# Patient Record
Sex: MALE | NOT HISPANIC OR LATINO | URBAN - NONMETROPOLITAN AREA
[De-identification: names, ages, dates, MRNs, and addresses within clinical notes are randomized per-mention and may not be internally consistent; named-entity substitution may affect disease eponyms.]

---

## 2018-12-04 ENCOUNTER — OFFICE VISIT CONVERTED (OUTPATIENT)
Dept: FAMILY MEDICINE CLINIC | Age: 63
End: 2018-12-04
Attending: NURSE PRACTITIONER

## 2020-01-06 ENCOUNTER — OFFICE VISIT CONVERTED (OUTPATIENT)
Dept: FAMILY MEDICINE CLINIC | Age: 65
End: 2020-01-06
Attending: NURSE PRACTITIONER

## 2021-05-18 NOTE — PROGRESS NOTES
Jaden Francisco  1955     Office/Outpatient Visit    Visit Date: Mon, Jan 6, 2020 12:21 pm    Provider: Singh Christian N.P. (Assistant: Lola Yuan MA)    Location: [facility]        Electronically signed by Singh Christian N.P. on  01/12/2020 01:52:46 PM                             Subjective:        CC: Mr. Francisco is a 64 year old White male.  Patient presents today with complaints of cough and congestion X 1 week         HPI:           PHQ-9 Depression Screening: Completed form scanned and in chart; Total Score 0           Additionally, he presents with history of acute upper respiratory infection, unspecified.  these have been present for the past one week.  The symptoms include chest congestion, productive cough, headache, nasal congestion and sputum production.  He denies fever or wheezing.  He denies exposure to ill contacts.  He has already tried to relieve the symptoms with cough medication and decongestants.  Pertinent medical history is unremarkable.      ROS:     CONSTITUTIONAL:  Negative for chills, fatigue and fever.      CARDIOVASCULAR:  Negative for chest pain, orthopnea, paroxysmal nocturnal dyspnea and pedal edema.      RESPIRATORY:  Positive for recent cough ( with scant amounts of purulent sputum ).   Negative for dyspnea.      GASTROINTESTINAL:  Negative for abdominal pain, heartburn, constipation, diarrhea, and stool changes.      PSYCHIATRIC:  Negative for anxiety and depression.          Past Medical History / Family History / Social History:         Last Reviewed on 1/06/2020 12:48 PM by Singh Christian    Past Medical History:             PAST MEDICAL HISTORY         left knee arthritis         PREVENTIVE HEALTH MAINTENANCE             COLORECTAL CANCER SCREENING: had at age 52  [facility]     DENTAL CLEANING: was last done 2018     EYE EXAM: was last done 2018 - [facility]         PAST MEDICAL HISTORY         Positive for    right  scapula injury;         Surgical History:         Appendectomy         Family History:         Mother: Dementia     Brother(s): MELANOMA - TREATED AND CURED         Social History:     Occupation: remodeling, 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          Alcohol:  Does not drink alcohol and never has.      Caffeine:  He admits to consuming caffeine via coffee.          Current Problems:     Last Reviewed on 1/06/2020 12:48 PM by Singh Christian    Mixed hyperlipidemia    Encounter for screening for depression        Immunizations:     Td adult 1/30/2009    Hep A, adult dose 12/4/2018    Fluzone Quadrivalent (3+ years) 12/4/2018        Allergies:     Last Reviewed on 1/06/2020 12:48 PM by Singh Christian    No Known Allergies.        Current Medications:     Last Reviewed on 1/06/2020 12:48 PM by Singh Christian    No Known Medications.        Objective:        Vitals:         Current: 1/6/2020 12:25:03 PM    Ht:  5 ft, 8 in;  Wt: 191 lbs;  BMI: 29.0T: 98.5 F (oral);  BP: 127/82 mm Hg (left arm, sitting);  P: 80 bpm (left arm (BP Cuff), sitting);  sCr: 1.06 mg/dL;  GFR: 70.39        Exams:     PHYSICAL EXAM:     GENERAL: Vitals recorded well developed, well nourished;  well groomed;  no apparent distress;     RESPIRATORY: normal respiratory rate and pattern with no distress; decreased breath sounds throughout; coarse breath sounds throughout;     CARDIOVASCULAR: normal rate; rhythm is regular;  normal S1; normal S2; no systolic murmur; no cyanosis; no edema;     GASTROINTESTINAL: nontender, nondistended; no hepatosplenomegaly or masses; no bruits;     NEUROLOGIC: GROSSLY INTACT     PSYCHIATRIC:  appropriate affect and demeanor; normal speech pattern; grossly normal memory;         Assessment:         Z13.31   Encounter for screening for depression        J20.9   Acute bronchitis, unspecified           ORDERS:         Meds Prescribed:       [New Rx] azithromycin 250 mg oral tablet [take 2 tablets today and 1 tablet daily x 4 days ], #6 (six) tablets, Refills: 0 (zero)         Other Orders:         Depression screen negative  (In-House)                      Plan:         Encounter for screening for depression    MIPS PHQ-9 Depression Screening: Completed form scanned and in chart; Total Score 0; Negative Depression Screen           Orders:         Depression screen negative  (In-House)              Acute bronchitis, unspecified        FOLLOW-UP: Advised to call if there is no improvement Follow-up by phone if no improvement in 1 week..  :Schedule follow-up appointments on a p.r.n. basis.:.            Prescriptions:       [New Rx] azithromycin 250 mg oral tablet [take 2 tablets today and 1 tablet daily x 4 days ], #6 (six) tablets, Refills: 0 (zero)             Patient Recommendations:        For  Acute bronchitis, unspecified:    Follow-up by phone if no improvement in 1 week. Schedule follow-up appointments as needed.                APPOINTMENT INFORMATION:        Monday Tuesday Wednesday Thursday Friday Saturday Sunday            Time:___________________AM  PM   Date:_____________________             Charge Capture:         Primary Diagnosis:     Z13.31  Encounter for screening for depression           Orders:      70664  Office/outpatient visit; established patient, level 3  (In-House)              Depression screen negative  (In-House)              J20.9  Acute bronchitis, unspecified

## 2021-05-19 NOTE — PROGRESS NOTES
Jaden Francisco 1955     Preventive Medicine Services    Visit Date: Tue, Dec 4, 2018 02:49 pm    Provider: Virginia Adam N.P. (Assistant: Azalea Corbin MA)    Location: [facility]        Electronically signed by Virginia Adam N.P. on  12/05/2018 03:08:16 PM                             SUBJECTIVE:        CC:     Mr. Francisco is a 63 year old White male.  This is his first visit to the clinic.  establish care (PT WANTS FLU VACCINE TODAY)         HPI:         Mr. Francisco is here for routine periodic health screening and examination.  His last physical exam was been some time\.      Physical Activity: ** Never exercises; He is current with his influenza immunization.      ROS:     CONSTITUTIONAL:  Negative for chills, fatigue and fever.      EYES:  Positive for use of glasses.   Negative for eye drainage.      E/N/T:  Negative for ear pain, nasal congestion and sore throat.      CARDIOVASCULAR:  Negative for chest pain and pedal edema.      RESPIRATORY:  Negative for recent cough, dyspnea and frequent wheezing.      GASTROINTESTINAL:  Positive for acid reflux symptoms ( one time this week but not normally ).   Negative for abdominal pain, constipation, diarrhea, nausea or vomiting.      GENITOURINARY:  Negative for dysuria and change in urine stream.      MUSCULOSKELETAL:  Positive for limb pain ( left  knee - chronic osteoarthritis ).   Negative for arthralgias or myalgias.      INTEGUMENTARY:  Negative for atypical mole(s) and rash.      NEUROLOGICAL:  Negative for dizziness and headaches.      HEMATOLOGIC/LYMPHATIC:  Negative for easy bruising and history of blood transfusion.      ENDOCRINE:  Negative for hair loss, polydipsia and polyphagia.      ALLERGIC/IMMUNOLOGIC:  Negative for seasonal allergies.      PSYCHIATRIC:  Negative for anxiety, crying spells, depression, feelings of stress, anhedonia, sadness, sleep disturbance and suicidal thoughts.          PMH/FMH/SH:     Last Reviewed  on 12/04/2018 03:10 PM by Virginia Adam    Past Medical History:             PAST MEDICAL HISTORY         left knee arthritis         PREVENTIVE HEALTH MAINTENANCE             COLORECTAL CANCER SCREENING: had at age 52  [facility]     DENTAL CLEANING: was last done 2018     EYE EXAM: was last done 2018 - [facility]         PAST MEDICAL HISTORY         Positive for    right scapula injury;         Surgical History:         Appendectomy         Family History:         Mother: Dementia     Brother(s): MELANOMA - TREATED AND CURED         Social History:     Occupation: remodeling;     Marital Status:      Children: 2 children         Tobacco/Alcohol/Supplements:     Last Reviewed on 12/04/2018 03:10 PM by Virginia Adam    Tobacco: He has never smoked.          Alcohol:  Does not drink alcohol and never has.      Caffeine:  He admits to consuming caffeine via coffee.          Substance Abuse History:     Last Reviewed on 12/04/2018 03:10 PM by Virginia Adam    None         Mental Health History:     Last Reviewed on 12/04/2018 03:10 PM by Virginia Adam    NEGATIVE         Communicable Diseases (eg STDs):     Last Reviewed on 12/04/2018 03:10 PM by Virginia Adam    Reportable health conditions; NEGATIVE             Current Problems:     Last Reviewed on 12/04/2018 03:10 PM by Virginia Adam    Hyperlipidemia     Screening for cardiovascular conditions         Immunizations:     Td adult 1/30/2009     Hep A, adult dose 12/4/2018     Fluzone Quadrivalent (3+ years) 12/4/2018         Allergies:     Last Reviewed on 12/04/2018 03:10 PM by Virginia Adam      No Known Drug Allergies.         Current Medications:     Last Reviewed on 12/04/2018 03:10 PM by Virginia Adam      No Known Medications.         OBJECTIVE:        Vitals:         Current: 12/4/2018 2:55:17 PM    Ht:  5 ft, 8 in;  Wt: 185.2 lbs;  BMI: 28.2    T: 98.2 F (oral);  BP: 122/70 mm Hg (left arm,  sitting);  P: 71 bpm (left arm (BP Cuff), sitting);  sCr: 1.06 mg/dL;  GFR: 70.33        Exams:     PHYSICAL EXAM:     GENERAL: Vitals recorded well developed, well nourished;  no apparent distress;     EYES: PERRL, EOMI     E/N/T: EARS: external auditory canal normal bilaterally;  bilateral TMs are normal;  NOSE:  normal nasal mucosa, septum, turbinates, and sinuses; OROPHARYNX:  normal mucosa, dentition, gingiva, and posterior pharynx;     NECK: range of motion is normal;     RESPIRATORY: normal appearance and symmetric expansion of chest wall; normal respiratory rate and pattern with no distress; normal breath sounds with no rales, rhonchi, wheezes or rubs;     CARDIOVASCULAR: normal rate; rhythm is regular;  no edema;     GASTROINTESTINAL: nontender; normal bowel sounds; no organomegaly;     LYMPHATIC: no enlargement of cervical or facial nodes; no supraclavicular nodes;     MUSCULOSKELETAL: normal gait; normal range of motion of all major muscle groups; no limb or joint pain with range of motion;     NEUROLOGIC: mental status: alert and oriented x 3;     PSYCHIATRIC: appropriate affect and demeanor; normal speech pattern; normal thought and perception;         Lab/Test Results:             Glucose, Serum:  101 (mg/dL) (11/30/2018),     Total Cholesterol:  212 (mg/dL) (11/30/2018),     HDL:  52 (mg/dL) (11/30/2018),     Triglycerides:  102 (mg/dL) (11/30/2018),     LDL:  140 (mg/dL) (11/30/2018),             Procedures:     Vaccination against other viral diseases, Influenza     1. Hepatitis A (adult): 1.0 ml Influenza, seasonal PF (children 3 years to adult): 0.5 ml unit dose given IM in the left upper arm; administered by [facility];  lot number [id number]; expires 6-30-19         Vaccination against hepatitis A     1. Hepatitis A (adult): 1.0 ml given IM in the right upper arm; administered by [facility];  lot number [id number]; expires 8-9-19             ASSESSMENT           V04.81   Z23  Vaccination against other viral  diseases, Influenza              DDx:     V70.0   Z00.00  Health checkup              DDx:     V05.3   Z23  Vaccination against hepatitis A              DDx:     272.4   E78.2  Hyperlipidemia              DDx:         ORDERS:         Procedures Ordered:       17172  HepA vaccine adult dose for intramuscular use  (In-House)         14067  Immunization administration; one vaccine  (In-House)         47234  Immunization administration; each additional vaccine/toxoid  (In-House)           Other Orders:       01169  Influenza virus vaccine, quadrivalent, split virus, preservative free 3 years of age and older  (In-House)           Depression screen negative  (In-House)         1101F  Pt screen for fall risk; document no falls in past year or only 1 fall w/o injury in past year (SYLVIA)  (In-House)           Negative EtOH screen  (In-House)           Calculated BMI above the upper parameter and a follow-up plan was documented in the medical record  (In-House)                   PLAN:          Vaccination against other viral diseases, Influenza         IMMUNIZATIONS given today: Influenza Quadrivalent psv free shot 3 and up.            Orders:       46301  Influenza virus vaccine, quadrivalent, split virus, preservative free 3 years of age and older  (In-House)         36829  Immunization administration; one vaccine  (In-House)            Health checkup     MIPS PHQ-9 Depression Screening Completed form scanned and in chart; Total Score 2 Negative alcohol screen     BMI Elevated - Follow-Up Plan: He was provided education on weight loss strategies           Orders:         Depression screen negative  (In-House)         1101F  Pt screen for fall risk; document no falls in past year or only 1 fall w/o injury in past year (SYLVIA)  (In-House)           Negative EtOH screen  (In-House)           Calculated BMI above the upper parameter and a follow-up plan was documented in the medical record  (In-House)             Vaccination against hepatitis A         IMMUNIZATIONS given today: Hepatitis A.            Orders:       46455  HepA vaccine adult dose for intramuscular use  (In-House)         30795  Immunization administration; each additional vaccine/toxoid  (In-House)            Hyperlipidemia discussed labs today and recommended daily ASA, dietary changes for improvement on lipid, weight loss             CHARGE CAPTURE           **Please note: ICD descriptions below are intended for billing purposes only and may not represent clinical diagnoses**        Primary Diagnosis:         V04.81 Vaccination against other viral diseases, Influenza            Z23    Encounter for immunization              Orders:          03825   Preventive medicine, established patient, age 40-64 years  (In-House)             93020   Influenza virus vaccine, quadrivalent, split virus, preservative free 3 years of age and older  (In-House)             88750   Immunization administration; one vaccine  (In-House)           V70.0 Health checkup            Z00.00    Encntr for general adult medical exam w/o abnormal findings              Orders:             Depression screen negative  (In-House)             1101F   Pt screen for fall risk; document no falls in past year or only 1 fall w/o injury in past year (SYLVIA)  (In-House)                Negative EtOH screen  (In-House)                Calculated BMI above the upper parameter and a follow-up plan was documented in the medical record  (In-House)           V05.3 Vaccination against hepatitis A            Z23    Encounter for immunization              Orders:          46458   HepA vaccine adult dose for intramuscular use  (In-House)             26471   Immunization administration; each additional vaccine/toxoid  (In-House)           272.4 Hyperlipidemia            E78.2    Mixed hyperlipidemia

## 2021-07-01 VITALS
WEIGHT: 185.2 LBS | HEIGHT: 68 IN | TEMPERATURE: 98.2 F | SYSTOLIC BLOOD PRESSURE: 122 MMHG | DIASTOLIC BLOOD PRESSURE: 70 MMHG | HEART RATE: 71 BPM | BODY MASS INDEX: 28.07 KG/M2

## 2021-07-02 VITALS
HEIGHT: 68 IN | TEMPERATURE: 98.5 F | HEART RATE: 80 BPM | BODY MASS INDEX: 28.95 KG/M2 | WEIGHT: 191 LBS | SYSTOLIC BLOOD PRESSURE: 127 MMHG | DIASTOLIC BLOOD PRESSURE: 82 MMHG

## 2024-01-30 ENCOUNTER — LAB (OUTPATIENT)
Dept: LAB | Facility: HOSPITAL | Age: 69
End: 2024-01-30
Payer: MEDICARE

## 2024-01-30 ENCOUNTER — OFFICE VISIT (OUTPATIENT)
Dept: FAMILY MEDICINE CLINIC | Age: 69
End: 2024-01-30
Payer: MEDICARE

## 2024-01-30 VITALS
SYSTOLIC BLOOD PRESSURE: 138 MMHG | HEIGHT: 68 IN | DIASTOLIC BLOOD PRESSURE: 90 MMHG | TEMPERATURE: 97.7 F | WEIGHT: 185 LBS | BODY MASS INDEX: 28.04 KG/M2 | OXYGEN SATURATION: 94 % | HEART RATE: 65 BPM

## 2024-01-30 DIAGNOSIS — Z00.00 MEDICARE ANNUAL WELLNESS VISIT, SUBSEQUENT: Primary | ICD-10-CM

## 2024-01-30 DIAGNOSIS — Z12.5 SCREENING FOR MALIGNANT NEOPLASM OF PROSTATE: ICD-10-CM

## 2024-01-30 DIAGNOSIS — Z13.6 SCREENING FOR CARDIOVASCULAR CONDITION: ICD-10-CM

## 2024-01-30 DIAGNOSIS — Z12.11 SCREENING FOR COLON CANCER: ICD-10-CM

## 2024-01-30 DIAGNOSIS — Z11.59 SCREENING FOR VIRAL DISEASE: ICD-10-CM

## 2024-01-30 PROBLEM — C44.320 SQUAMOUS CELL CARCINOMA, FACE: Status: ACTIVE | Noted: 2024-01-30

## 2024-01-30 PROBLEM — M25.561 CHRONIC PAIN OF BOTH KNEES: Status: ACTIVE | Noted: 2024-01-30

## 2024-01-30 PROBLEM — G89.29 CHRONIC PAIN OF BOTH KNEES: Status: ACTIVE | Noted: 2024-01-30

## 2024-01-30 PROBLEM — M25.562 CHRONIC PAIN OF BOTH KNEES: Status: ACTIVE | Noted: 2024-01-30

## 2024-01-30 LAB
ALBUMIN SERPL-MCNC: 4.3 G/DL (ref 3.5–5.2)
ALBUMIN/GLOB SERPL: 1.9 G/DL
ALP SERPL-CCNC: 62 U/L (ref 39–117)
ALT SERPL W P-5'-P-CCNC: 14 U/L (ref 1–41)
ANION GAP SERPL CALCULATED.3IONS-SCNC: 11 MMOL/L (ref 5–15)
AST SERPL-CCNC: 21 U/L (ref 1–40)
BILIRUB SERPL-MCNC: 0.5 MG/DL (ref 0–1.2)
BUN SERPL-MCNC: 12 MG/DL (ref 8–23)
BUN/CREAT SERPL: 10.9 (ref 7–25)
CALCIUM SPEC-SCNC: 9.5 MG/DL (ref 8.6–10.5)
CHLORIDE SERPL-SCNC: 103 MMOL/L (ref 98–107)
CHOLEST SERPL-MCNC: 222 MG/DL (ref 0–200)
CO2 SERPL-SCNC: 26 MMOL/L (ref 22–29)
CREAT SERPL-MCNC: 1.1 MG/DL (ref 0.76–1.27)
EGFRCR SERPLBLD CKD-EPI 2021: 73.6 ML/MIN/1.73
GLOBULIN UR ELPH-MCNC: 2.3 GM/DL
GLUCOSE SERPL-MCNC: 92 MG/DL (ref 65–99)
HCV AB SER DONR QL: NORMAL
HDLC SERPL-MCNC: 50 MG/DL (ref 40–60)
LDLC SERPL CALC-MCNC: 152 MG/DL (ref 0–100)
LDLC/HDLC SERPL: 2.99 {RATIO}
POTASSIUM SERPL-SCNC: 4.4 MMOL/L (ref 3.5–5.2)
PROT SERPL-MCNC: 6.6 G/DL (ref 6–8.5)
PSA SERPL-MCNC: 0.52 NG/ML (ref 0–4)
SODIUM SERPL-SCNC: 140 MMOL/L (ref 136–145)
TRIGL SERPL-MCNC: 112 MG/DL (ref 0–150)
VLDLC SERPL-MCNC: 20 MG/DL (ref 5–40)

## 2024-01-30 PROCEDURE — 36415 COLL VENOUS BLD VENIPUNCTURE: CPT

## 2024-01-30 PROCEDURE — 80061 LIPID PANEL: CPT

## 2024-01-30 PROCEDURE — G0103 PSA SCREENING: HCPCS

## 2024-01-30 PROCEDURE — 86803 HEPATITIS C AB TEST: CPT

## 2024-01-30 PROCEDURE — 80053 COMPREHEN METABOLIC PANEL: CPT

## 2024-01-30 NOTE — PROGRESS NOTES
The ABCs of the Annual Wellness Visit  Subsequent Medicare Wellness Visit    Subjective      Jaden Francisco is a 69 y.o. male who presents for a Subsequent Medicare Wellness Visit.    The following portions of the patient's history were reviewed and   updated as appropriate: allergies, current medications, past family history, past medical history, past social history, past surgical history, and problem list.    Compared to one year ago, the patient feels his physical   health is the same.    Compared to one year ago, the patient feels his mental   health is the same.    Recent Hospitalizations:  He was not admitted to the hospital during the last year.       Current Medical Providers:  Patient Care Team:  Virginia Adam APRN as PCP - General (Nurse Practitioner)  Renita Harley MD (Dermatology)  Cabrera Mon MD as Consulting Physician (Orthopedic Surgery)    No outpatient medications prior to visit.     No facility-administered medications prior to visit.       No opioid medication identified on active medication list. I have reviewed chart for other potential  high risk medication/s and harmful drug interactions in the elderly.        Aspirin is not on active medication list.  Aspirin use is not indicated based on review of current medical condition/s. Risk of harm outweighs potential benefits.  .    Patient Active Problem List   Diagnosis    Chronic pain of both knees    Squamous cell carcinoma, face     Advance Care Planning   Advance Care Planning     Advance Directive is not on file.  ACP discussion was held with the patient during this visit. Patient does not have an advance directive, declines further assistance.     Objective    Vitals:    01/30/24 0947 01/30/24 0951   BP: 136/98 138/90   BP Location: Left arm Right arm   Patient Position: Sitting Sitting   Cuff Size: Infant Adult   Pulse: 68 65   Temp: 97.7 °F (36.5 °C)    TempSrc: Oral    SpO2: 94%    Weight: 83.9 kg (185 lb)    Height: 172.7 cm  "(67.99\")      Estimated body mass index is 28.14 kg/m² as calculated from the following:    Height as of this encounter: 172.7 cm (67.99\").    Weight as of this encounter: 83.9 kg (185 lb).    BMI is >= 25 and <30. (Overweight) The following options were offered after discussion;: exercise counseling/recommendations and nutrition counseling/recommendations      Does the patient have evidence of cognitive impairment?   No    He does not wish to get any vaccines including pneumonia, shingrix, RSV, covid, influenza, Tdap.  He is not up to date on his colonoscopy and is willing to schedule.            HEALTH RISK ASSESSMENT    Smoking Status:  Social History     Tobacco Use   Smoking Status Never   Smokeless Tobacco Never     Alcohol Consumption:  Social History     Substance and Sexual Activity   Alcohol Use Never     Fall Risk Screen:    GILLIAN Fall Risk Assessment was completed, and patient is at LOW risk for falls.Assessment completed on:2024    Depression Screenin/30/2024     9:40 AM   PHQ-2/PHQ-9 Depression Screening   Little Interest or Pleasure in Doing Things 0-->not at all   Feeling Down, Depressed or Hopeless 0-->not at all   PHQ-9: Brief Depression Severity Measure Score 0       Health Habits and Functional and Cognitive Screenin/30/2024    10:38 AM   Functional & Cognitive Status   Do you have difficulty preparing food and eating? No   Do you have difficulty bathing yourself, getting dressed or grooming yourself? No   Do you have difficulty using the toilet? No   Do you have difficulty moving around from place to place? No   Do you have trouble with steps or getting out of a bed or a chair? No   Current Diet Well Balanced Diet   Dental Exam Up to date   Eye Exam Up to date   Exercise (times per week) 7 times per week   Current Exercises Include Yard Work   Do you need help using the phone?  No   Are you deaf or do you have serious difficulty hearing?  No   Do you need help to go to " places out of walking distance? No   Do you need help shopping? No   Do you need help preparing meals?  No   Do you need help with housework?  No   Do you need help with laundry? No   Do you need help taking your medications? No   Do you need help managing money? No   Do you ever drive or ride in a car without wearing a seat belt? No   Have you felt unusual stress, anger or loneliness in the last month? No   Who do you live with? Spouse   If you need help, do you have trouble finding someone available to you? No   Have you been bothered in the last four weeks by sexual problems? No   Do you have difficulty concentrating, remembering or making decisions? No       Age-appropriate Screening Schedule:  Refer to the list below for future screening recommendations based on patient's age, sex and/or medical conditions. Orders for these recommended tests are listed in the plan section. The patient has been provided with a written plan.    Health Maintenance   Topic Date Due    COLORECTAL CANCER SCREENING  Never done    HEPATITIS C SCREENING  Never done    COVID-19 Vaccine (3 - 2023-24 season) 02/01/2024 (Originally 9/1/2023)    INFLUENZA VACCINE  03/31/2024 (Originally 8/1/2023)    Pneumococcal Vaccine 65+ (1 of 1 - PCV) 01/30/2025 (Originally 1/24/2022)    TDAP/TD VACCINES (2 - Tdap) 01/30/2025 (Originally 3/8/2007)    ZOSTER VACCINE (1 of 2) 01/30/2025 (Originally 1/24/2007)    ANNUAL WELLNESS VISIT  01/30/2025    BMI FOLLOWUP  01/30/2025                  CMS Preventative Services Quick Reference  Risk Factors Identified During Encounter:    Immunizations Discussed/Encouraged: Tdap, Influenza, Prevnar 20 (Pneumococcal 20-valent conjugate), Shingrix, COVID19, and RSV (Respiratory Syncytial Virus)    The above risks/problems have been discussed with the patient.  Pertinent information has been shared with the patient in the After Visit Summary.    Diagnoses and all orders for this visit:    1. Medicare annual wellness  visit, subsequent (Primary)    2. Screening for cardiovascular condition  -     Comprehensive metabolic panel; Future  -     Lipid panel; Future    3. Screening for colon cancer  -     Ambulatory Referral For Screening Colonoscopy    4. Screening for viral disease  -     Hepatitis C antibody; Future    5. Screening for malignant neoplasm of prostate  -     PSA SCREENING; Future        Follow Up:   Next Medicare Wellness visit to be scheduled in 1 year.      An After Visit Summary and PPPS were made available to the patient.

## 2024-02-23 PROBLEM — K57.90 DIVERTICULOSIS: Status: ACTIVE | Noted: 2024-02-23

## 2025-02-03 ENCOUNTER — OFFICE VISIT (OUTPATIENT)
Dept: FAMILY MEDICINE CLINIC | Age: 70
End: 2025-02-03
Payer: MEDICARE

## 2025-02-03 VITALS
TEMPERATURE: 98.1 F | OXYGEN SATURATION: 97 % | SYSTOLIC BLOOD PRESSURE: 138 MMHG | WEIGHT: 189 LBS | HEIGHT: 68 IN | HEART RATE: 63 BPM | BODY MASS INDEX: 28.64 KG/M2 | DIASTOLIC BLOOD PRESSURE: 89 MMHG

## 2025-02-03 DIAGNOSIS — M17.12 OSTEOARTHRITIS OF LEFT KNEE, UNSPECIFIED OSTEOARTHRITIS TYPE: ICD-10-CM

## 2025-02-03 DIAGNOSIS — Z00.00 MEDICARE ANNUAL WELLNESS VISIT, SUBSEQUENT: Primary | ICD-10-CM

## 2025-02-03 PROCEDURE — 1170F FXNL STATUS ASSESSED: CPT | Performed by: NURSE PRACTITIONER

## 2025-02-03 PROCEDURE — 1125F AMNT PAIN NOTED PAIN PRSNT: CPT | Performed by: NURSE PRACTITIONER

## 2025-02-03 PROCEDURE — 1160F RVW MEDS BY RX/DR IN RCRD: CPT | Performed by: NURSE PRACTITIONER

## 2025-02-03 PROCEDURE — G0439 PPPS, SUBSEQ VISIT: HCPCS | Performed by: NURSE PRACTITIONER

## 2025-02-03 PROCEDURE — 1159F MED LIST DOCD IN RCRD: CPT | Performed by: NURSE PRACTITIONER

## 2025-02-03 PROCEDURE — 99213 OFFICE O/P EST LOW 20 MIN: CPT | Performed by: NURSE PRACTITIONER

## 2025-02-03 NOTE — PROGRESS NOTES
"The ABCs of the Annual Wellness Visit  Subsequent Medicare Wellness Visit    Subjective    Jaden Francisco is a 70 y.o. patient who presents for a Subsequent Medicare Wellness Visit.    The following portions of the patient's history were reviewed and updated as appropriate: allergies, current medications, past family history, past medical history, past social history, past surgical history, and problem list.    Compared to one year ago, the patient feels his physical health is worse. Just over his knees    Compared to one year ago, the patient feels his mental health is the same.    Recent Hospitalizations:  He was not admitted to the hospital during the last year.     Current Medical Providers:  Patient Care Team:  Virginia Adam APRN as PCP - General (Nurse Practitioner)  Renita Harley MD (Dermatology)  Cabrera Mon MD as Consulting Physician (Orthopedic Surgery)    No opioid medication identified on active medication list. I have reviewed chart for other potential  high risk medication/s and harmful drug interactions in the elderly.        Aspirin is not on active medication list.  Aspirin use is not indicated based on review of current medical condition/s. Risk of harm outweighs potential benefits.  .      Patient Active Problem List   Diagnosis    Chronic pain of both knees    Squamous cell carcinoma, face    Diverticulosis       Advance Care Planning  Advance Directive is not on file.  ACP discussion was held with the patient during this visit. Patient does not have an advance directive, declines further assistance.    Objective    Vitals:    02/03/25 0908   BP: 138/89   BP Location: Right arm   Patient Position: Sitting   Cuff Size: Adult   Pulse: 63   Temp: 98.1 °F (36.7 °C)   TempSrc: Oral   SpO2: 97%   Weight: 85.7 kg (189 lb)   Height: 172.7 cm (67.99\")   PainSc:   2     Estimated body mass index is 28.75 kg/m² as calculated from the following:    Height as of this encounter: 172.7 cm (67.99\").   "  Weight as of this encounter: 85.7 kg (189 lb).    BMI is >= 25 and <30. (Overweight) The following options were offered after discussion;: weight loss educational material (shared in after visit summary)      Does the patient have evidence of cognitive impairment? No          HEALTH RISK ASSESSMENT    Smoking Status:  Social History     Tobacco Use   Smoking Status Never   Smokeless Tobacco Never     Alcohol Consumption:  Social History     Substance and Sexual Activity   Alcohol Use Never     Fall Risk Screen:    GILLIAN Fall Risk Assessment was completed, and patient is at LOW risk for falls.Assessment completed on:2/3/2025    Depression Screenin/3/2025     9:13 AM   PHQ-2/PHQ-9 Depression Screening   Little interest or pleasure in doing things Not at all   Feeling down, depressed, or hopeless Not at all       Health Habits and Functional and Cognitive Screenin/3/2025     9:00 AM   Functional & Cognitive Status   Do you have difficulty preparing food and eating? No   Do you have difficulty bathing yourself, getting dressed or grooming yourself? No   Do you have difficulty using the toilet? No   Do you have difficulty moving around from place to place? No   Do you have trouble with steps or getting out of a bed or a chair? No   Current Diet Well Balanced Diet   Dental Exam Up to date   Eye Exam Up to date   Exercise (times per week) 7 times per week   Current Exercises Include Yard Work   Do you need help using the phone?  No   Are you deaf or do you have serious difficulty hearing?  No   Do you need help to go to places out of walking distance? No   Do you need help shopping? No   Do you need help preparing meals?  No   Do you need help with housework?  No   Do you need help with laundry? No   Do you need help taking your medications? No   Do you need help managing money? No   Do you ever drive or ride in a car without wearing a seat belt? No   Have you felt unusual stress, anger or loneliness in  the last month? No   Who do you live with? Spouse   If you need help, do you have trouble finding someone available to you? No   Have you been bothered in the last four weeks by sexual problems? No   Do you have difficulty concentrating, remembering or making decisions? No       Age-appropriate Screening Schedule:  Refer to the list below for future screening recommendations based on patient's age, sex and/or medical conditions. Orders for these recommended tests are listed in the plan section. The patient has been provided with a written plan.    Health Maintenance   Topic Date Due    ZOSTER VACCINE (1 of 2) 02/03/2025 (Originally 1/24/2007)    COVID-19 Vaccine (3 - 2024-25 season) 02/05/2025 (Originally 9/1/2024)    INFLUENZA VACCINE  03/31/2025 (Originally 7/1/2024)    Pneumococcal Vaccine 65+ (1 of 1 - PCV) 02/03/2026 (Originally 1/24/2007)    TDAP/TD VACCINES (2 - Tdap) 02/03/2026 (Originally 3/8/2007)    ANNUAL WELLNESS VISIT  02/03/2026    BMI FOLLOWUP  02/03/2026    COLORECTAL CANCER SCREENING  02/22/2034    HEPATITIS C SCREENING  Completed                CMS Preventative Services Quick Reference  Risk Factors Identified During Encounter  Immunizations Discussed/Encouraged: Tdap, Influenza, Pneumococcal 23, Shingrix, and COVID19  The above risks/problems have been discussed with the patient.  Pertinent information has been shared with the patient in the After Visit Summary.  An After Visit Summary and PPPS were made available to the patient.    Follow Up:   Next Medicare Wellness visit to be scheduled in 1 year.     Additional E&M Note during same encounter follows:  Patient has multiple medical problems which are significant and separately identifiable that require additional work above and beyond the Medicare Wellness Visit.      Chief Complaint:    Chief Complaint   Patient presents with    Medicare Wellness-subsequent    and chronic medical condition follow up       Subjective    History of Present  "Illness:  In addition to the Medicare wellness exam,     He is also needing surgical clearance for his left knee surgery with Dr. Mon with [facility]  EKG was completed on 1/23/2025 and noted to be 'normal' although I am unable to see the tracing, report is available.  BMP and CBC completed and are in acceptable range.      He is up to date on colonoscoy and declines all vaccines    Review of Systems:  Review of Systems     Objective   Vital Signs:  /89 (BP Location: Right arm, Patient Position: Sitting, Cuff Size: Adult)   Pulse 63   Temp 98.1 °F (36.7 °C) (Oral)   Ht 172.7 cm (67.99\")   Wt 85.7 kg (189 lb)   SpO2 97%   BMI 28.75 kg/m²     Physical Exam  Vitals reviewed.   Constitutional:       Appearance: Normal appearance.   HENT:      Head: Normocephalic.   Eyes:      Pupils: Pupils are equal, round, and reactive to light.   Cardiovascular:      Rate and Rhythm: Normal rate and regular rhythm.      Heart sounds: No murmur heard.  Pulmonary:      Effort: Pulmonary effort is normal.      Breath sounds: Normal breath sounds.   Musculoskeletal:         General: Normal range of motion.      Right knee: Crepitus present.      Left knee: Crepitus present.   Neurological:      Mental Status: He is alert.   Psychiatric:         Mood and Affect: Mood normal.         Behavior: Behavior normal.       The following data was reviewed by SANJAY Rico on 02/03/2025.    No outpatient medications prior to visit.     No facility-administered medications prior to visit.          Assessment and Plan:       Diagnoses and all orders for this visit:    1. Medicare annual wellness visit, subsequent (Primary)    2. Osteoarthritis of left knee, unspecified osteoarthritis type  Comments:  ok to proceed with surgery ,  low risk for complications, advised on follow up recommendation compliance;  form faxed today                 Follow Up  Return in about 1 year (around 2/3/2026), or if symptoms worsen or fail " to improve, for Medicare Wellness.  Patient was given instructions and counseling regarding his condition or for health maintenance advice. Please see specific information pulled into the AVS if appropriate.

## 2025-06-25 ENCOUNTER — TELEPHONE (OUTPATIENT)
Dept: FAMILY MEDICINE CLINIC | Age: 70
End: 2025-06-25
Payer: MEDICARE

## 2025-06-25 NOTE — TELEPHONE ENCOUNTER
*HUB TO RELAY* RANDA GENAO WILL NO LONGER BE IN OUR OFFICE AFTER 09/05/25. Called pt to establish care with a new provider. No answer, left VM.